# Patient Record
Sex: FEMALE | Race: WHITE | NOT HISPANIC OR LATINO | Employment: OTHER
[De-identification: names, ages, dates, MRNs, and addresses within clinical notes are randomized per-mention and may not be internally consistent; named-entity substitution may affect disease eponyms.]

---

## 2017-09-08 ENCOUNTER — HEALTH MAINTENANCE LETTER (OUTPATIENT)
Age: 37
End: 2017-09-08

## 2018-09-14 ENCOUNTER — HEALTH MAINTENANCE LETTER (OUTPATIENT)
Age: 38
End: 2018-09-14

## 2018-10-05 DIAGNOSIS — Z01.84 IMMUNITY STATUS TESTING: Primary | ICD-10-CM

## 2019-09-27 ENCOUNTER — HEALTH MAINTENANCE LETTER (OUTPATIENT)
Age: 39
End: 2019-09-27

## 2021-01-09 ENCOUNTER — HEALTH MAINTENANCE LETTER (OUTPATIENT)
Age: 41
End: 2021-01-09

## 2021-10-23 ENCOUNTER — HEALTH MAINTENANCE LETTER (OUTPATIENT)
Age: 41
End: 2021-10-23

## 2022-02-12 ENCOUNTER — HEALTH MAINTENANCE LETTER (OUTPATIENT)
Age: 42
End: 2022-02-12

## 2022-10-09 ENCOUNTER — HEALTH MAINTENANCE LETTER (OUTPATIENT)
Age: 42
End: 2022-10-09

## 2023-03-25 ENCOUNTER — HEALTH MAINTENANCE LETTER (OUTPATIENT)
Age: 43
End: 2023-03-25

## 2024-03-16 ENCOUNTER — HEALTH MAINTENANCE LETTER (OUTPATIENT)
Age: 44
End: 2024-03-16

## 2024-07-08 ENCOUNTER — MEDICAL CORRESPONDENCE (OUTPATIENT)
Dept: HEALTH INFORMATION MANAGEMENT | Facility: CLINIC | Age: 44
End: 2024-07-08
Payer: COMMERCIAL

## 2024-07-08 ENCOUNTER — TRANSFERRED RECORDS (OUTPATIENT)
Dept: HEALTH INFORMATION MANAGEMENT | Facility: CLINIC | Age: 44
End: 2024-07-08
Payer: COMMERCIAL

## 2024-07-08 LAB — PHQ9 SCORE: 6

## 2024-07-09 DIAGNOSIS — Z82.49 FAMILY HISTORY OF CARDIOMYOPATHY: Primary | ICD-10-CM

## 2024-07-09 DIAGNOSIS — Z13.71 TESTING OF FEMALE FOR GENETIC DISEASE CARRIER STATUS: ICD-10-CM

## 2024-07-10 ENCOUNTER — TELEPHONE (OUTPATIENT)
Dept: CARDIOLOGY | Facility: CLINIC | Age: 44
End: 2024-07-10
Payer: COMMERCIAL

## 2024-07-10 NOTE — TELEPHONE ENCOUNTER
1st attempt- Left voicemail for the patient to call back and schedule the following:    Appointment type:  New Cardiology  Provider: Nila Taylor  Return date:  Next avail     Additonal Notes: schedule with ANY MD  Specialty phone number: [phone]   MK

## 2024-08-03 ENCOUNTER — HEALTH MAINTENANCE LETTER (OUTPATIENT)
Age: 44
End: 2024-08-03

## 2024-10-10 ENCOUNTER — OFFICE VISIT (OUTPATIENT)
Dept: CARDIOLOGY | Facility: CLINIC | Age: 44
End: 2024-10-10
Attending: FAMILY MEDICINE
Payer: COMMERCIAL

## 2024-10-10 VITALS
DIASTOLIC BLOOD PRESSURE: 80 MMHG | SYSTOLIC BLOOD PRESSURE: 130 MMHG | BODY MASS INDEX: 37.21 KG/M2 | HEIGHT: 63 IN | WEIGHT: 210 LBS | OXYGEN SATURATION: 100 % | HEART RATE: 64 BPM

## 2024-10-10 DIAGNOSIS — Z82.49 FAMILY HISTORY OF CARDIOMYOPATHY: ICD-10-CM

## 2024-10-10 DIAGNOSIS — Z13.71 TESTING OF FEMALE FOR GENETIC DISEASE CARRIER STATUS: ICD-10-CM

## 2024-10-10 PROCEDURE — 99204 OFFICE O/P NEW MOD 45 MIN: CPT | Performed by: INTERNAL MEDICINE

## 2024-10-10 RX ORDER — ESCITALOPRAM OXALATE 10 MG/1
1 TABLET ORAL EVERY MORNING
COMMUNITY
Start: 2023-11-16

## 2024-10-10 RX ORDER — PROPRANOLOL HYDROCHLORIDE 10 MG/1
10 TABLET ORAL 2 TIMES DAILY PRN
COMMUNITY
Start: 2023-11-16

## 2024-10-10 NOTE — LETTER
10/10/2024    Nila Taylor  [address]  [facility] [address]    RE: Daysi Morris       Dear Colleague,     I had the pleasure of seeing Days[facility].  CARDIOLOGY CLINIC CONSULTATION    PRIMARY CARE PHYSICIAN:  Nila Dorothy Brandon    HISTORY OF PRESENT ILLNESS:  This is a absolutely delightful 44-year-old female who denies any known prior cardiovascular history.  She has a history of situational anxiety for which she takes propranolol rarely particularly with flying.  She was a prior smoker during her younger life but has not smoked in many years.  No diabetes.  She has obesity with a BMI of 38.  No family history of premature cardiovascular disease.    Patient tells me that her uncle was diagnosed with hypertrophic cardiomyopathy in his 30s.  Her mother was diagnosed sometime in her 50s during routine screening.  Her mother tested positive for genetic mutation.  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  In the past she has had a normal ECG and normal echocardiogram.    Now she is transferring care to [facility] and seeing me for the first time.  She does not have kids and does not plan to have kids.  No immediate first-degree relatives.    She is completely asymptomatic from a cardiac standpoint.  However she complains of occasional intermittent palpitations which she is not sure if they are related to menopause or arrhythmias.    PAST MEDICAL HISTORY:  Past Medical History:   Diagnosis Date     Allergic rhinitis, cause unspecified      Fam hx-cardiovas dis NEC     Father       MEDICATIONS:  Current Outpatient Medications   Medication Sig Dispense Refill     escitalopram (LEXAPRO) 10 MG tablet Take 1 tablet by mouth every morning.       Fexofenadine HCl (ALLEGRA ALLERGY PO) Take  by mouth.       propranolol (INDERAL) 10 MG tablet Take 10 mg by  mouth 2 times daily as needed (Twice daily as needed anxiety or flying).       No current facility-administered medications for this visit.       SOCIAL HISTORY:  I have reviewed this patient's social history and updated it with pertinent information if needed. Daysi Morris  reports that she quit smoking about 22 years ago. She started smoking about 27 years ago. She has a 2.5 pack-year smoking history. She has never used smokeless tobacco. She reports current alcohol use of about 3.3 standard drinks of alcohol per week. She reports that she does not use drugs.    PHYSICAL EXAM:  Pulse:  [64] 64  BP: (130)/(80) 130/80  SpO2:  [100 %] 100 %  210 lbs 0 oz    Constitutional: alert, no distress  Respiratory: Good bilateral air entry  Cardiovascular: Normal regular heart sounds  GI: nondistended  Neuropsychiatric: appropriate affact    ASSESSMENT: Pertinent issues addressed/ reviewed during this cardiology visit  Family history of hypertrophic cardiomyopathy  Gene positive phenotype status  Obesity    RECOMMENDATIONS:  We had a long discussion about the fact that she is gene positive but phenotypically she does not have disease as yet.  Her ECG shows normal sinus rhythm with no signs of left ventricular hypertrophy.  Her echocardiogram 2 years ago was normal.    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    In general, gene positive and phenotypically negative patient's such as her, should be screened every 2 to 3 years clinically and with ECG and echocardiography however late manifestation of disease usually after 40 to 50 years of age is low.  Particularly risk of sudden cardiac death in the absence of disease is also relatively low.    Her most recent ECG shows no significant findings of HCM.  Usual ECG findings proceed echocardiography findings.  I will get a  resting baseline echocardiogram since it has been a couple years but I anticipate this to be normal.  Assuming that is the case, I would recommend repeating ECG and echocardiography in 3 years from now.    I will see the patient back in clinic in 2 years for routine follow-up.  She says that she does not have any immediate first-degree relatives that warrant screening although she has been informed.    Lastly given her palpitations, I recommend getting a 14-day Zio monitor.    On another note, overall cardiovascular health prevention is recommended.  Healthy diet exercise weight loss and trying to get BMI within normal limits over the next few years strongly recommended to reduce future risk of cardiovascular events.    It was a pleasure seeing this patient in clinic today. Please do not hesitate to contact me with any future questions.     AMRITA Bolaños, [facility]  Cardiology - [facility]  October 10, 2024    Review of the result(s) of each unique test - Last outside records CBC BMP lipids     The level of medical decision making during this visit was of moderate complexity.    This note was completed in part using dictation via the Dragon voice recognition software. Some word and grammatical errors may occur and must be interpreted in the appropriate clinical context.  If there are any questions pertaining to this issue, please contact me for further clarification.      Thank you for allowing me to participate in the care of your patient.      Sincerely,     Colin Liz MD     [facility]  cc:   Nila Taylor  [address]  [place] [address]

## 2024-10-10 NOTE — PROGRESS NOTES
CARDIOLOGY CLINIC CONSULTATION    PRIMARY CARE PHYSICIAN:  Nila Taylor    HISTORY OF PRESENT ILLNESS:  This is a absolutely delightful 44-year-old female who denies any known prior cardiovascular history.  She has a history of situational anxiety for which she takes propranolol rarely particularly with flying.  She was a prior smoker during her younger life but has not smoked in many years.  No diabetes.  She has obesity with a BMI of 38.  No family history of premature cardiovascular disease.    Patient tells me that her uncle was diagnosed with hypertrophic cardiomyopathy in his 30s.  Her mother was diagnosed sometime in her 50s during routine screening.  Her mother tested positive for genetic mutation.  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  In the past she has had a normal ECG and normal echocardiogram.    Now she is transferring care to [facility] and seeing me for the first time.  She does not have kids and does not plan to have kids.  No immediate first-degree relatives.    She is completely asymptomatic from a cardiac standpoint.  However she complains of occasional intermittent palpitations which she is not sure if they are related to menopause or arrhythmias.    PAST MEDICAL HISTORY:  Past Medical History:   Diagnosis Date    Allergic rhinitis, cause unspecified     Fam hx-cardiovas dis NEC     Father       MEDICATIONS:  Current Outpatient Medications   Medication Sig Dispense Refill    escitalopram (LEXAPRO) 10 MG tablet Take 1 tablet by mouth every morning.      Fexofenadine HCl (ALLEGRA ALLERGY PO) Take  by mouth.      propranolol (INDERAL) 10 MG tablet Take 10 mg by mouth 2 times daily as needed (Twice daily as needed anxiety or flying).       No current facility-administered medications for this visit.       SOCIAL HISTORY:  I have reviewed this patient's social history and updated  it with pertinent information if needed. Daysi Morris  reports that she quit smoking about 22 years ago. She started smoking about 27 years ago. She has a 2.5 pack-year smoking history. She has never used smokeless tobacco. She reports current alcohol use of about 3.3 standard drinks of alcohol per week. She reports that she does not use drugs.    PHYSICAL EXAM:  Pulse:  [64] 64  BP: (130)/(80) 130/80  SpO2:  [100 %] 100 %  210 lbs 0 oz    Constitutional: alert, no distress  Respiratory: Good bilateral air entry  Cardiovascular: Normal regular heart sounds  GI: nondistended  Neuropsychiatric: appropriate affact    ASSESSMENT: Pertinent issues addressed/ reviewed during this cardiology visit  Family history of hypertrophic cardiomyopathy  Gene positive phenotype status  Obesity    RECOMMENDATIONS:  We had a long discussion about the fact that she is gene positive but phenotypically she does not have disease as yet.  Her ECG shows normal sinus rhythm with no signs of left ventricular hypertrophy.  Her echocardiogram 2 years ago was normal.    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    In general, gene positive and phenotypically negative patient's such as her, should be screened every 2 to 3 years clinically and with ECG and echocardiography however late manifestation of disease usually after 40 to 50 years of age is low.  Particularly risk of sudden cardiac death in the absence of disease is also relatively low.    Her most recent ECG shows no significant findings of HCM.  Usual ECG findings proceed echocardiography findings.  I will get a resting baseline echocardiogram since it has been a couple years but I anticipate this to be normal.  Assuming that is the case, I would recommend repeating ECG and echocardiography in 3 years from now.    I will see the  patient back in clinic in 2 years for routine follow-up.  She says that she does not have any immediate first-degree relatives that warrant screening although she has been informed.    Lastly given her palpitations, I recommend getting a 14-day Zio monitor.    On another note, overall cardiovascular health prevention is recommended.  Healthy diet exercise weight loss and trying to get BMI within normal limits over the next few years strongly recommended to reduce future risk of cardiovascular events.    It was a pleasure seeing this patient in clinic today. Please do not hesitate to contact me with any future questions.     AMRITA Bolaños, [facility]  Cardiology - [facility]  October 10, 2024    Review of the result(s) of each unique test - Last outside records CBC BMP lipids     The level of medical decision making during this visit was of moderate complexity.    This note was completed in part using dictation via the Dragon voice recognition software. Some word and grammatical errors may occur and must be interpreted in the appropriate clinical context.  If there are any questions pertaining to this issue, please contact me for further clarification.

## 2025-01-07 ENCOUNTER — CARE COORDINATION (OUTPATIENT)
Dept: CARDIOLOGY | Facility: CLINIC | Age: 45
End: 2025-01-07
Payer: COMMERCIAL

## 2025-01-07 DIAGNOSIS — Z13.71 TESTING OF FEMALE FOR GENETIC DISEASE CARRIER STATUS: ICD-10-CM

## 2025-01-07 DIAGNOSIS — Z82.49 FAMILY HISTORY OF CARDIOMYOPATHY: Primary | ICD-10-CM

## 2025-01-07 NOTE — PROGRESS NOTES
"Echo completed following new patient evaluation for preventative consult d/t family history of HCM and pt personal history of a genetic mutation. Per Dr. Liz's OV note: \"Her most recent ECG shows no significant findings of HCM. Usual ECG findings proceed echocardiography findings. I will get a resting baseline echocardiogram since it has been a couple years but I anticipate this to be normal. Assuming that is the case, I would recommend repeating ECG and echocardiography in 3 years from now.\"    RN updated pt that the echo report is normal, and she can return in 3 years with an EKG, Echo and OV. Sooner if any clinical changes or concerns develop. Results to Dr. Liz to sign off. Rochelle Stevenson RN on 1/7/2025 at 9:16 AM      "

## 2025-01-20 NOTE — TELEPHONE ENCOUNTER
Thank you.  I would have her follow-up in a couple of years with us.  Echo did not report LVH.  Please alert us with any clinical changes such as presyncope syncope or other cardiac symptoms.

## 2025-08-16 ENCOUNTER — HEALTH MAINTENANCE LETTER (OUTPATIENT)
Age: 45
End: 2025-08-16